# Patient Record
Sex: MALE | Race: WHITE | NOT HISPANIC OR LATINO | URBAN - METROPOLITAN AREA
[De-identification: names, ages, dates, MRNs, and addresses within clinical notes are randomized per-mention and may not be internally consistent; named-entity substitution may affect disease eponyms.]

---

## 2018-06-27 ENCOUNTER — OUTPATIENT (OUTPATIENT)
Dept: OUTPATIENT SERVICES | Facility: HOSPITAL | Age: 58
LOS: 1 days | Discharge: HOME | End: 2018-06-27
Payer: COMMERCIAL

## 2018-06-27 DIAGNOSIS — Z41.9 ENCOUNTER FOR PROCEDURE FOR PURPOSES OTHER THAN REMEDYING HEALTH STATE, UNSPECIFIED: Chronic | ICD-10-CM

## 2018-06-27 DIAGNOSIS — K40.20 BILATERAL INGUINAL HERNIA, WITHOUT OBSTRUCTION OR GANGRENE, NOT SPECIFIED AS RECURRENT: ICD-10-CM

## 2018-06-27 DIAGNOSIS — Z90.49 ACQUIRED ABSENCE OF OTHER SPECIFIED PARTS OF DIGESTIVE TRACT: Chronic | ICD-10-CM

## 2018-06-27 DIAGNOSIS — Z98.890 OTHER SPECIFIED POSTPROCEDURAL STATES: Chronic | ICD-10-CM

## 2018-06-27 LAB
ALBUMIN SERPL ELPH-MCNC: 3.6 G/DL — SIGNIFICANT CHANGE UP (ref 3.3–5)
ALP SERPL-CCNC: 83 U/L — SIGNIFICANT CHANGE UP (ref 40–120)
ALT FLD-CCNC: 39 U/L — SIGNIFICANT CHANGE UP (ref 12–78)
ANION GAP SERPL CALC-SCNC: 4 MMOL/L — LOW (ref 5–17)
APPEARANCE UR: CLEAR — SIGNIFICANT CHANGE UP
AST SERPL-CCNC: 37 U/L — SIGNIFICANT CHANGE UP (ref 15–37)
BASOPHILS # BLD AUTO: 0.05 K/UL — SIGNIFICANT CHANGE UP (ref 0–0.2)
BASOPHILS NFR BLD AUTO: 0.9 % — SIGNIFICANT CHANGE UP (ref 0–2)
BILIRUB SERPL-MCNC: 0.7 MG/DL — SIGNIFICANT CHANGE UP (ref 0.2–1.2)
BILIRUB UR-MCNC: NEGATIVE — SIGNIFICANT CHANGE UP
BUN SERPL-MCNC: 27 MG/DL — HIGH (ref 7–23)
CALCIUM SERPL-MCNC: 8.8 MG/DL — SIGNIFICANT CHANGE UP (ref 8.5–10.1)
CHLORIDE SERPL-SCNC: 110 MMOL/L — HIGH (ref 96–108)
CO2 SERPL-SCNC: 30 MMOL/L — SIGNIFICANT CHANGE UP (ref 22–31)
COLOR SPEC: YELLOW — SIGNIFICANT CHANGE UP
CREAT SERPL-MCNC: 1.25 MG/DL — SIGNIFICANT CHANGE UP (ref 0.5–1.3)
DIFF PNL FLD: NEGATIVE — SIGNIFICANT CHANGE UP
EOSINOPHIL # BLD AUTO: 0.18 K/UL — SIGNIFICANT CHANGE UP (ref 0–0.5)
EOSINOPHIL NFR BLD AUTO: 3.1 % — SIGNIFICANT CHANGE UP (ref 0–6)
GLUCOSE SERPL-MCNC: 84 MG/DL — SIGNIFICANT CHANGE UP (ref 70–99)
GLUCOSE UR QL: NEGATIVE MG/DL — SIGNIFICANT CHANGE UP
HCT VFR BLD CALC: 44.9 % — SIGNIFICANT CHANGE UP (ref 39–50)
HGB BLD-MCNC: 15.4 G/DL — SIGNIFICANT CHANGE UP (ref 13–17)
IMM GRANULOCYTES NFR BLD AUTO: 0.2 % — SIGNIFICANT CHANGE UP (ref 0–1.5)
KETONES UR-MCNC: NEGATIVE — SIGNIFICANT CHANGE UP
LEUKOCYTE ESTERASE UR-ACNC: NEGATIVE — SIGNIFICANT CHANGE UP
LYMPHOCYTES # BLD AUTO: 1.63 K/UL — SIGNIFICANT CHANGE UP (ref 1–3.3)
LYMPHOCYTES # BLD AUTO: 28.4 % — SIGNIFICANT CHANGE UP (ref 13–44)
MCHC RBC-ENTMCNC: 32.3 PG — SIGNIFICANT CHANGE UP (ref 27–34)
MCHC RBC-ENTMCNC: 34.3 GM/DL — SIGNIFICANT CHANGE UP (ref 32–36)
MCV RBC AUTO: 94.1 FL — SIGNIFICANT CHANGE UP (ref 80–100)
MONOCYTES # BLD AUTO: 0.43 K/UL — SIGNIFICANT CHANGE UP (ref 0–0.9)
MONOCYTES NFR BLD AUTO: 7.5 % — SIGNIFICANT CHANGE UP (ref 2–14)
MRSA PCR RESULT.: SIGNIFICANT CHANGE UP
NEUTROPHILS # BLD AUTO: 3.44 K/UL — SIGNIFICANT CHANGE UP (ref 1.8–7.4)
NEUTROPHILS NFR BLD AUTO: 59.9 % — SIGNIFICANT CHANGE UP (ref 43–77)
NITRITE UR-MCNC: NEGATIVE — SIGNIFICANT CHANGE UP
PH UR: 5 — SIGNIFICANT CHANGE UP (ref 5–8)
PLATELET # BLD AUTO: 185 K/UL — SIGNIFICANT CHANGE UP (ref 150–400)
POTASSIUM SERPL-MCNC: 4.3 MMOL/L — SIGNIFICANT CHANGE UP (ref 3.5–5.3)
POTASSIUM SERPL-SCNC: 4.3 MMOL/L — SIGNIFICANT CHANGE UP (ref 3.5–5.3)
PROT SERPL-MCNC: 7 GM/DL — SIGNIFICANT CHANGE UP (ref 6–8.3)
PROT UR-MCNC: NEGATIVE MG/DL — SIGNIFICANT CHANGE UP
RBC # BLD: 4.77 M/UL — SIGNIFICANT CHANGE UP (ref 4.2–5.8)
RBC # FLD: 12.2 % — SIGNIFICANT CHANGE UP (ref 10.3–14.5)
S AUREUS DNA NOSE QL NAA+PROBE: SIGNIFICANT CHANGE UP
SODIUM SERPL-SCNC: 144 MMOL/L — SIGNIFICANT CHANGE UP (ref 135–145)
SP GR SPEC: 1.01 — SIGNIFICANT CHANGE UP (ref 1.01–1.02)
UROBILINOGEN FLD QL: NEGATIVE MG/DL — SIGNIFICANT CHANGE UP
WBC # BLD: 5.74 K/UL — SIGNIFICANT CHANGE UP (ref 3.8–10.5)
WBC # FLD AUTO: 5.74 K/UL — SIGNIFICANT CHANGE UP (ref 3.8–10.5)

## 2018-06-27 PROCEDURE — 71046 X-RAY EXAM CHEST 2 VIEWS: CPT | Mod: 26

## 2018-06-27 PROCEDURE — 93010 ELECTROCARDIOGRAM REPORT: CPT

## 2018-06-27 NOTE — ASU PATIENT PROFILE, ADULT - PMH
Asthma    GERD (Gastroesophageal Reflux Disease)    Groin pain  b/l  Inguinal hernia bilateral, non-recurrent    Pancreatitis

## 2018-06-27 NOTE — CHART NOTE - NSCHARTNOTEFT_GEN_A_CORE
BP left arm sitting 117/75   HR 70 bpm  Yudy 98 F  RR 14/min  O2 sat 100% on RA      1. Labs as per surgeon  2. EKG  3. Medical clearance with PCP Dr Dg Marquez  4. discussed EZ sponges,  mupirocin & day of procedure instructions  5. CXR

## 2018-06-27 NOTE — ASU PATIENT PROFILE, ADULT - PSH
Elective surgery  pudendal nerve release  S/P Arthroscopic Knee Surgery  b/l  S/P colonoscopy    S/P laparoscopic cholecystectomy  2016

## 2018-07-02 ENCOUNTER — OUTPATIENT (OUTPATIENT)
Dept: OUTPATIENT SERVICES | Facility: HOSPITAL | Age: 58
LOS: 1 days | Discharge: HOME | End: 2018-07-02
Payer: COMMERCIAL

## 2018-07-02 VITALS
RESPIRATION RATE: 15 BRPM | OXYGEN SATURATION: 100 % | TEMPERATURE: 98 F | DIASTOLIC BLOOD PRESSURE: 81 MMHG | WEIGHT: 169.98 LBS | SYSTOLIC BLOOD PRESSURE: 138 MMHG | HEIGHT: 67 IN | HEART RATE: 58 BPM

## 2018-07-02 VITALS
SYSTOLIC BLOOD PRESSURE: 117 MMHG | TEMPERATURE: 98 F | HEART RATE: 60 BPM | DIASTOLIC BLOOD PRESSURE: 66 MMHG | RESPIRATION RATE: 16 BRPM | OXYGEN SATURATION: 99 %

## 2018-07-02 DIAGNOSIS — Z41.9 ENCOUNTER FOR PROCEDURE FOR PURPOSES OTHER THAN REMEDYING HEALTH STATE, UNSPECIFIED: Chronic | ICD-10-CM

## 2018-07-02 DIAGNOSIS — Z90.49 ACQUIRED ABSENCE OF OTHER SPECIFIED PARTS OF DIGESTIVE TRACT: Chronic | ICD-10-CM

## 2018-07-02 DIAGNOSIS — Z98.890 OTHER SPECIFIED POSTPROCEDURAL STATES: Chronic | ICD-10-CM

## 2018-07-02 PROCEDURE — 49650 LAP ING HERNIA REPAIR INIT: CPT | Mod: AS,50

## 2018-07-02 RX ORDER — PANTOPRAZOLE SODIUM 20 MG/1
1 TABLET, DELAYED RELEASE ORAL
Qty: 0 | Refills: 0 | COMMUNITY

## 2018-07-02 RX ORDER — OMEGA-3 ACID ETHYL ESTERS 1 G
0 CAPSULE ORAL
Qty: 0 | Refills: 0 | COMMUNITY

## 2018-07-02 RX ORDER — CHOLECALCIFEROL (VITAMIN D3) 125 MCG
1 CAPSULE ORAL
Qty: 0 | Refills: 0 | COMMUNITY

## 2018-07-02 RX ORDER — FENTANYL CITRATE 50 UG/ML
50 INJECTION INTRAVENOUS
Qty: 0 | Refills: 0 | Status: DISCONTINUED | OUTPATIENT
Start: 2018-07-02 | End: 2018-07-02

## 2018-07-02 RX ORDER — URSODIOL 250 MG/1
1 TABLET, FILM COATED ORAL
Qty: 0 | Refills: 0 | COMMUNITY

## 2018-07-02 RX ORDER — ONDANSETRON 8 MG/1
4 TABLET, FILM COATED ORAL ONCE
Qty: 0 | Refills: 0 | Status: DISCONTINUED | OUTPATIENT
Start: 2018-07-02 | End: 2018-07-02

## 2018-07-02 RX ORDER — LIPASE/PROTEASE/AMYLASE 16-48-48K
0 CAPSULE,DELAYED RELEASE (ENTERIC COATED) ORAL
Qty: 0 | Refills: 0 | COMMUNITY

## 2018-07-02 RX ORDER — ALBUTEROL 90 UG/1
2 AEROSOL, METERED ORAL
Qty: 0 | Refills: 0 | COMMUNITY

## 2018-07-02 RX ORDER — OXYCODONE HYDROCHLORIDE 5 MG/1
10 TABLET ORAL EVERY 6 HOURS
Qty: 0 | Refills: 0 | Status: DISCONTINUED | OUTPATIENT
Start: 2018-07-02 | End: 2018-07-02

## 2018-07-02 RX ADMIN — FENTANYL CITRATE 50 MICROGRAM(S): 50 INJECTION INTRAVENOUS at 08:51

## 2018-07-02 RX ADMIN — FENTANYL CITRATE 50 MICROGRAM(S): 50 INJECTION INTRAVENOUS at 09:02

## 2018-07-02 RX ADMIN — OXYCODONE HYDROCHLORIDE 10 MILLIGRAM(S): 5 TABLET ORAL at 09:02

## 2018-07-02 RX ADMIN — FENTANYL CITRATE 50 MICROGRAM(S): 50 INJECTION INTRAVENOUS at 08:44

## 2018-07-02 NOTE — BRIEF OPERATIVE NOTE - PROCEDURE
<<-----Click on this checkbox to enter Procedure Laparoscopic inguinal hernia repair  07/02/2018    Active  [email or web address]

## 2018-07-02 NOTE — ASU DISCHARGE PLAN (ADULT/PEDIATRIC). - MEDICATION SUMMARY - MEDICATIONS TO TAKE
I will START or STAY ON the medications listed below when I get home from the hospital:    Ventolin HFA 90 mcg/inh inhalation aerosol  -- 2 puff(s) inhaled 4 times a day, As Needed  -- Indication: For as per PMD    Creon  -- orally 3 times a day  -- Indication: For as per PMD    ursodiol 300 mg oral capsule  -- 1 cap(s) by mouth 3 times a day  -- Indication: For as per PMD    Flax Seed Oil oral capsule  -- orally once a day  -- Indication: For as per PMD    Omega-3 oral capsule  -- orally once a day  -- Indication: For as per PMD    pantoprazole 40 mg oral delayed release tablet  -- 1 tab(s) by mouth once a day  -- Indication: For as per PMD    Vitamin D3 1000 intl units oral tablet  -- 1 tab(s) by mouth once a day  -- Indication: For as per PMD

## 2018-07-02 NOTE — ASU DISCHARGE PLAN (ADULT/PEDIATRIC). - NOTIFY
Persistent Nausea and Vomiting/Bleeding that does not stop/Swelling that continues/Unable to Urinate

## 2018-07-02 NOTE — ASU PATIENT PROFILE, ADULT - VISION (WITH CORRECTIVE LENSES IF THE PATIENT USUALLY WEARS THEM):
Partially impaired: cannot see medication labels or newsprint, but can see obstacles in path, and the surrounding layout; can count fingers at arm's length/DISTANCE

## 2018-07-02 NOTE — BRIEF OPERATIVE NOTE - PRE-OP DX
Bilateral inguinal hernia without obstruction or gangrene, recurrence not specified  07/02/2018    Active  Harish Irving

## 2018-07-10 DIAGNOSIS — K40.20 BILATERAL INGUINAL HERNIA, WITHOUT OBSTRUCTION OR GANGRENE, NOT SPECIFIED AS RECURRENT: ICD-10-CM

## 2018-07-10 DIAGNOSIS — K21.9 GASTRO-ESOPHAGEAL REFLUX DISEASE WITHOUT ESOPHAGITIS: ICD-10-CM

## 2018-07-10 DIAGNOSIS — Z90.49 ACQUIRED ABSENCE OF OTHER SPECIFIED PARTS OF DIGESTIVE TRACT: ICD-10-CM

## 2018-07-10 DIAGNOSIS — J45.909 UNSPECIFIED ASTHMA, UNCOMPLICATED: ICD-10-CM

## 2020-07-21 ENCOUNTER — EMERGENCY (EMERGENCY)
Facility: HOSPITAL | Age: 60
LOS: 0 days | Discharge: HOME | End: 2020-07-21
Payer: COMMERCIAL

## 2020-07-21 VITALS
DIASTOLIC BLOOD PRESSURE: 64 MMHG | RESPIRATION RATE: 18 BRPM | HEART RATE: 54 BPM | OXYGEN SATURATION: 100 % | TEMPERATURE: 98 F | SYSTOLIC BLOOD PRESSURE: 133 MMHG

## 2020-07-21 DIAGNOSIS — Z98.890 OTHER SPECIFIED POSTPROCEDURAL STATES: Chronic | ICD-10-CM

## 2020-07-21 DIAGNOSIS — Z88.8 ALLERGY STATUS TO OTHER DRUGS, MEDICAMENTS AND BIOLOGICAL SUBSTANCES: ICD-10-CM

## 2020-07-21 DIAGNOSIS — Z90.49 ACQUIRED ABSENCE OF OTHER SPECIFIED PARTS OF DIGESTIVE TRACT: Chronic | ICD-10-CM

## 2020-07-21 DIAGNOSIS — B34.9 VIRAL INFECTION, UNSPECIFIED: ICD-10-CM

## 2020-07-21 DIAGNOSIS — J02.9 ACUTE PHARYNGITIS, UNSPECIFIED: ICD-10-CM

## 2020-07-21 DIAGNOSIS — Z41.9 ENCOUNTER FOR PROCEDURE FOR PURPOSES OTHER THAN REMEDYING HEALTH STATE, UNSPECIFIED: Chronic | ICD-10-CM

## 2020-07-21 DIAGNOSIS — Z20.828 CONTACT WITH AND (SUSPECTED) EXPOSURE TO OTHER VIRAL COMMUNICABLE DISEASES: ICD-10-CM

## 2020-07-21 PROBLEM — K40.20 BILATERAL INGUINAL HERNIA, WITHOUT OBSTRUCTION OR GANGRENE, NOT SPECIFIED AS RECURRENT: Chronic | Status: ACTIVE | Noted: 2018-06-27

## 2020-07-21 PROBLEM — R10.30 LOWER ABDOMINAL PAIN, UNSPECIFIED: Chronic | Status: ACTIVE | Noted: 2018-06-27

## 2020-07-21 PROBLEM — J45.909 UNSPECIFIED ASTHMA, UNCOMPLICATED: Chronic | Status: ACTIVE | Noted: 2018-06-27

## 2020-07-21 LAB — SARS-COV-2 RNA SPEC QL NAA+PROBE: SIGNIFICANT CHANGE UP

## 2020-07-21 PROCEDURE — 99283 EMERGENCY DEPT VISIT LOW MDM: CPT

## 2020-07-21 PROCEDURE — U0003: CPT

## 2020-07-21 NOTE — ED STATDOCS - OBJECTIVE STATEMENT
Pt presents to ED with no fever, no cough, no runny nose, no body aches, +sore throat x 1 days. Pt recently exposed to COVID-19. Pt here for testing.

## 2020-07-21 NOTE — ED ADULT NURSE NOTE - OBJECTIVE STATEMENT
PATIENT WAS TREATED, EVALUATED AND DISCHARGED BY INTAKE PROVIDER. PLEASE SEE PROVIDER NOTE FOR ASSESSMENT.  DISCHARGE INSTRUCTIONS REVIEWED WITH PATIENT VERBALLY, PT VERBALIZED UNDERSTANDING OF DISCHARGE INSTRUCTIONS. PAPER COPY OF DISCHARGE INSTRUCTIONS GIVEN TO PATIENT WITH SELF ANA AND COVID 19 INFORMATION.

## 2020-07-21 NOTE — ED STATDOCS - PATIENT PORTAL LINK FT
You can access the FollowMyHealth Patient Portal offered by [facility] by registering at the following website: [email or web address]. By joining [employer]’s FollowMyHealth portal, you will also be able to view your health information using other applications (apps) compatible with our system.

## 2020-08-05 ENCOUNTER — RESULT REVIEW (OUTPATIENT)
Age: 60
End: 2020-08-05

## 2021-02-17 ENCOUNTER — OUTPATIENT (OUTPATIENT)
Dept: OUTPATIENT SERVICES | Facility: HOSPITAL | Age: 61
LOS: 1 days | End: 2021-02-17
Payer: COMMERCIAL

## 2021-02-17 DIAGNOSIS — Z98.890 OTHER SPECIFIED POSTPROCEDURAL STATES: Chronic | ICD-10-CM

## 2021-02-17 DIAGNOSIS — Z90.49 ACQUIRED ABSENCE OF OTHER SPECIFIED PARTS OF DIGESTIVE TRACT: Chronic | ICD-10-CM

## 2021-02-17 DIAGNOSIS — Z20.828 CONTACT WITH AND (SUSPECTED) EXPOSURE TO OTHER VIRAL COMMUNICABLE DISEASES: ICD-10-CM

## 2021-02-17 DIAGNOSIS — Z41.9 ENCOUNTER FOR PROCEDURE FOR PURPOSES OTHER THAN REMEDYING HEALTH STATE, UNSPECIFIED: Chronic | ICD-10-CM

## 2021-02-17 LAB — SARS-COV-2 RNA SPEC QL NAA+PROBE: SIGNIFICANT CHANGE UP

## 2021-02-17 PROCEDURE — C9803: CPT

## 2021-02-17 PROCEDURE — U0003: CPT

## 2021-02-17 PROCEDURE — U0005: CPT

## 2021-02-18 DIAGNOSIS — Z20.828 CONTACT WITH AND (SUSPECTED) EXPOSURE TO OTHER VIRAL COMMUNICABLE DISEASES: ICD-10-CM

## 2022-05-06 ENCOUNTER — EMERGENCY (EMERGENCY)
Facility: HOSPITAL | Age: 62
LOS: 0 days | Discharge: HOME | End: 2022-05-06
Attending: EMERGENCY MEDICINE
Payer: COMMERCIAL

## 2022-05-06 VITALS
TEMPERATURE: 100 F | HEART RATE: 88 BPM | SYSTOLIC BLOOD PRESSURE: 101 MMHG | DIASTOLIC BLOOD PRESSURE: 66 MMHG | OXYGEN SATURATION: 95 % | RESPIRATION RATE: 19 BRPM

## 2022-05-06 VITALS — WEIGHT: 173.94 LBS | HEIGHT: 67 IN

## 2022-05-06 DIAGNOSIS — Z41.9 ENCOUNTER FOR PROCEDURE FOR PURPOSES OTHER THAN REMEDYING HEALTH STATE, UNSPECIFIED: Chronic | ICD-10-CM

## 2022-05-06 DIAGNOSIS — J45.909 UNSPECIFIED ASTHMA, UNCOMPLICATED: ICD-10-CM

## 2022-05-06 DIAGNOSIS — K21.9 GASTRO-ESOPHAGEAL REFLUX DISEASE WITHOUT ESOPHAGITIS: ICD-10-CM

## 2022-05-06 DIAGNOSIS — R10.9 UNSPECIFIED ABDOMINAL PAIN: ICD-10-CM

## 2022-05-06 DIAGNOSIS — Z98.890 OTHER SPECIFIED POSTPROCEDURAL STATES: Chronic | ICD-10-CM

## 2022-05-06 DIAGNOSIS — K29.80 DUODENITIS WITHOUT BLEEDING: ICD-10-CM

## 2022-05-06 DIAGNOSIS — Z90.49 ACQUIRED ABSENCE OF OTHER SPECIFIED PARTS OF DIGESTIVE TRACT: Chronic | ICD-10-CM

## 2022-05-06 DIAGNOSIS — Z88.6 ALLERGY STATUS TO ANALGESIC AGENT: ICD-10-CM

## 2022-05-06 LAB
ALBUMIN SERPL ELPH-MCNC: 3.5 G/DL — SIGNIFICANT CHANGE UP (ref 3.3–5)
ALP SERPL-CCNC: 78 U/L — SIGNIFICANT CHANGE UP (ref 40–120)
ALT FLD-CCNC: 24 U/L — SIGNIFICANT CHANGE UP (ref 12–78)
ANION GAP SERPL CALC-SCNC: 8 MMOL/L — SIGNIFICANT CHANGE UP (ref 5–17)
APTT BLD: 24.8 SEC — LOW (ref 27.5–35.5)
AST SERPL-CCNC: 16 U/L — SIGNIFICANT CHANGE UP (ref 15–37)
BASOPHILS # BLD AUTO: 0.03 K/UL — SIGNIFICANT CHANGE UP (ref 0–0.2)
BASOPHILS NFR BLD AUTO: 0.3 % — SIGNIFICANT CHANGE UP (ref 0–2)
BILIRUB SERPL-MCNC: 1.4 MG/DL — HIGH (ref 0.2–1.2)
BUN SERPL-MCNC: 17 MG/DL — SIGNIFICANT CHANGE UP (ref 7–23)
CALCIUM SERPL-MCNC: 8.8 MG/DL — SIGNIFICANT CHANGE UP (ref 8.5–10.1)
CHLORIDE SERPL-SCNC: 109 MMOL/L — HIGH (ref 96–108)
CO2 SERPL-SCNC: 23 MMOL/L — SIGNIFICANT CHANGE UP (ref 22–31)
CREAT SERPL-MCNC: 1.58 MG/DL — HIGH (ref 0.5–1.3)
EGFR: 49 ML/MIN/1.73M2 — LOW
EOSINOPHIL # BLD AUTO: 0.03 K/UL — SIGNIFICANT CHANGE UP (ref 0–0.5)
EOSINOPHIL NFR BLD AUTO: 0.3 % — SIGNIFICANT CHANGE UP (ref 0–6)
GLUCOSE SERPL-MCNC: 123 MG/DL — HIGH (ref 70–99)
HCT VFR BLD CALC: 42 % — SIGNIFICANT CHANGE UP (ref 39–50)
HGB BLD-MCNC: 14.3 G/DL — SIGNIFICANT CHANGE UP (ref 13–17)
IMM GRANULOCYTES NFR BLD AUTO: 0.3 % — SIGNIFICANT CHANGE UP (ref 0–1.5)
INR BLD: 1.3 RATIO — HIGH (ref 0.88–1.16)
LACTATE SERPL-SCNC: 1 MMOL/L — SIGNIFICANT CHANGE UP (ref 0.7–2)
LIDOCAIN IGE QN: 41 U/L — LOW (ref 73–393)
LYMPHOCYTES # BLD AUTO: 0.7 K/UL — LOW (ref 1–3.3)
LYMPHOCYTES # BLD AUTO: 5.9 % — LOW (ref 13–44)
MCHC RBC-ENTMCNC: 31.8 PG — SIGNIFICANT CHANGE UP (ref 27–34)
MCHC RBC-ENTMCNC: 34 GM/DL — SIGNIFICANT CHANGE UP (ref 32–36)
MCV RBC AUTO: 93.3 FL — SIGNIFICANT CHANGE UP (ref 80–100)
MONOCYTES # BLD AUTO: 1.19 K/UL — HIGH (ref 0–0.9)
MONOCYTES NFR BLD AUTO: 10.1 % — SIGNIFICANT CHANGE UP (ref 2–14)
NEUTROPHILS # BLD AUTO: 9.82 K/UL — HIGH (ref 1.8–7.4)
NEUTROPHILS NFR BLD AUTO: 83.1 % — HIGH (ref 43–77)
PLATELET # BLD AUTO: 176 K/UL — SIGNIFICANT CHANGE UP (ref 150–400)
POTASSIUM SERPL-MCNC: 4 MMOL/L — SIGNIFICANT CHANGE UP (ref 3.5–5.3)
POTASSIUM SERPL-SCNC: 4 MMOL/L — SIGNIFICANT CHANGE UP (ref 3.5–5.3)
PROT SERPL-MCNC: 6.8 GM/DL — SIGNIFICANT CHANGE UP (ref 6–8.3)
PROTHROM AB SERPL-ACNC: 15.1 SEC — HIGH (ref 10.5–13.4)
RBC # BLD: 4.5 M/UL — SIGNIFICANT CHANGE UP (ref 4.2–5.8)
RBC # FLD: 12.8 % — SIGNIFICANT CHANGE UP (ref 10.3–14.5)
SODIUM SERPL-SCNC: 140 MMOL/L — SIGNIFICANT CHANGE UP (ref 135–145)
WBC # BLD: 11.81 K/UL — HIGH (ref 3.8–10.5)
WBC # FLD AUTO: 11.81 K/UL — HIGH (ref 3.8–10.5)

## 2022-05-06 PROCEDURE — 93010 ELECTROCARDIOGRAM REPORT: CPT

## 2022-05-06 PROCEDURE — 99285 EMERGENCY DEPT VISIT HI MDM: CPT | Mod: 25

## 2022-05-06 PROCEDURE — 74177 CT ABD & PELVIS W/CONTRAST: CPT | Mod: 26,MA

## 2022-05-06 PROCEDURE — 99285 EMERGENCY DEPT VISIT HI MDM: CPT

## 2022-05-06 PROCEDURE — 83690 ASSAY OF LIPASE: CPT

## 2022-05-06 PROCEDURE — 83605 ASSAY OF LACTIC ACID: CPT

## 2022-05-06 PROCEDURE — 74177 CT ABD & PELVIS W/CONTRAST: CPT | Mod: MA

## 2022-05-06 PROCEDURE — 85025 COMPLETE CBC W/AUTO DIFF WBC: CPT

## 2022-05-06 PROCEDURE — 85730 THROMBOPLASTIN TIME PARTIAL: CPT

## 2022-05-06 PROCEDURE — 93005 ELECTROCARDIOGRAM TRACING: CPT

## 2022-05-06 PROCEDURE — 80053 COMPREHEN METABOLIC PANEL: CPT

## 2022-05-06 PROCEDURE — 71045 X-RAY EXAM CHEST 1 VIEW: CPT | Mod: 26

## 2022-05-06 PROCEDURE — 71045 X-RAY EXAM CHEST 1 VIEW: CPT

## 2022-05-06 PROCEDURE — 85610 PROTHROMBIN TIME: CPT

## 2022-05-06 PROCEDURE — 36415 COLL VENOUS BLD VENIPUNCTURE: CPT

## 2022-05-06 PROCEDURE — 87040 BLOOD CULTURE FOR BACTERIA: CPT

## 2022-05-06 RX ORDER — MORPHINE SULFATE 50 MG/1
4 CAPSULE, EXTENDED RELEASE ORAL ONCE
Refills: 0 | Status: DISCONTINUED | OUTPATIENT
Start: 2022-05-06 | End: 2022-05-06

## 2022-05-06 RX ORDER — SODIUM CHLORIDE 9 MG/ML
1000 INJECTION INTRAMUSCULAR; INTRAVENOUS; SUBCUTANEOUS ONCE
Refills: 0 | Status: COMPLETED | OUTPATIENT
Start: 2022-05-06 | End: 2022-05-06

## 2022-05-06 RX ORDER — OXYCODONE AND ACETAMINOPHEN 5; 325 MG/1; MG/1
1 TABLET ORAL
Qty: 12 | Refills: 0
Start: 2022-05-06 | End: 2022-05-08

## 2022-05-06 RX ORDER — ONDANSETRON 8 MG/1
4 TABLET, FILM COATED ORAL ONCE
Refills: 0 | Status: COMPLETED | OUTPATIENT
Start: 2022-05-06 | End: 2022-05-06

## 2022-05-06 RX ADMIN — SODIUM CHLORIDE 1000 MILLILITER(S): 9 INJECTION INTRAMUSCULAR; INTRAVENOUS; SUBCUTANEOUS at 19:31

## 2022-05-06 NOTE — ED STATDOCS - CARE PROVIDER_API CALL
Dg Marquez  INTERNAL MEDICINE  [address] [place] [address]  Phone: [phone]  Fax: [phone]  Follow Up Time:

## 2022-05-06 NOTE — ED STATDOCS - NS ED ROS FT
Constitutional: No fever or chills  Eyes: No visual changes  HEENT: No throat pain  CV: No chest pain  Resp: No SOB no cough  GI: + abd pain, nausea. no vomiting  : No dysuria  MSK: No musculoskeletal pain  Skin: No rash  Neuro: No headache

## 2022-05-06 NOTE — ED ADULT NURSE NOTE - OBJECTIVE STATEMENT
patient presents to the ED c/o abdominal pain. + vomiting, nausea, decreased PO intake. hx pancreatitis, patient states today's pain feels similar. patient states he had ECRP monday and put on augmentin by his GI who is in [place].

## 2022-05-06 NOTE — ED ADULT TRIAGE NOTE - CHIEF COMPLAINT QUOTE
patient presenting ambulatory to ED c/o abdominal pain. + vomiting, nausea, decreased PO intake. hx pancreatitis, patient states today's pain feels similar. patient states he had ECRP monday and put on augmentin by his GI who he travels to [place] to see.

## 2022-05-06 NOTE — ED STATDOCS - PATIENT PORTAL LINK FT
You can access the FollowMyHealth Patient Portal offered by [facility] by registering at the following website: [email or web address]. By joining [employer]’s FollowMyHealth portal, you will also be able to view your health information using other applications (apps) compatible with our system.

## 2022-05-06 NOTE — ED STATDOCS - ATTENDING APP SHARED VISIT CONTRIBUTION OF CARE
I, Robbie Taylor MD, personally saw the patient with ACP.  I have personally performed a face to face diagnostic evaluation on this patient.  I have reviewed the ACP note and agree with the history, exam, and plan of care, except as noted.   The initial assessment was performed by myself and then the patient was handed off to the ACP. The patient was followed and re-evaluated by the ACP. All labs, imaging and procedures were evaluated and performed by the ACP and I was available for consultation if any questions in the patients care came up.

## 2022-05-06 NOTE — ED STATDOCS - NS_ ATTENDINGSCRIBEDETAILS _ED_A_ED_FT
I, Robbie Taylor MD,  performed the initial face to face bedside interview with this patient regarding history of present illness, review of symptoms and relevant past medical, social and family history.  I completed an independent physical examination.  I was the initial provider who evaluated this patient.   I personally saw the patient and performed a substantive portion of the visit including all aspects of the medical decision making.  The history, relevant review of systems, past medical and surgical history, medical decision making, and physical examination was documented by the scribe in my presence and I attest to the accuracy of the documentation.

## 2022-05-06 NOTE — ED STATDOCS - OBJECTIVE STATEMENT
61 M hx asthma, inguinal hernia, GERD, pancreatitis here complaining abdominal pain and nausea. no vomiting. pt also reports having low grade fever and chills. pt states symptoms feel similar to pancreatitis.  pt states he went to see GI at [place] on Monday 4 days ago and had an ERCP and EUS procedure and got put on Augmentin afterwards.  pt states that he had a biopsy done of pancreas and thinks this may have caused his symptoms.  non smoker. no EtOH drinker. no drug user.

## 2022-05-06 NOTE — ED STATDOCS - PROGRESS NOTE DETAILS
Pt. on Augmentin.  Carina Bundy PA-C Pt s/p Endo with biopsy pancreas.  CT demonstrating mild circumferential wall thickening of proximal duodenum.  Duodenitis.  Pneumobilia related to ERCP.  No pancreatis.   Mild leukocytosis and elevated creatinine at 1.58 which patient states is normal for him.  Mildly elevated T bili 1.4.  Labs, CT discussed with patient.  Normal Lipases.  I paged his GI Dr. Harish Morton,  at [address] in [place] at [phone] Labs / CT all discussed with patient and wife.  DC with short course of Percocet and GI follow up.  REtuern for any worsening symptmos.  Carina Bundy PA-C Labs / CT all discussed with patient and wife.  DC with short course of Percocet and GI follow up.  REtuern for any worsening symptoms.  Carina Bundy PA-C I spoke with covering physician for Dr. Morton.  Aware of CT reading and will let Dr. Morton know on Monday.  Carina Bundy PA-C

## 2022-05-06 NOTE — ED STATDOCS - NSFOLLOWUPINSTRUCTIONS_ED_ALL_ED_FT
Duodenitis       Duodenitis is inflammation of the lining of the first part of the small intestine (duodenum). It is commonly caused by an infection from bacteria, which may also lead to open sores (ulcers) in the intestine.    Duodenitis may develop suddenly and last for a short time (acute), or it may develop gradually and last for months or years (chronic).      What are the causes?    The most common cause of duodenitis is an infection from a type of bacteria called Helicobacter pylori (H. pylori). Other causes of this condition include:  •Long-term use of NSAIDs.      •Excessive use of alcohol.      •An infection of the small intestine caused by the Giardia parasite (giardiasis).      •Crohn's disease.      •Certain diseases of the body's defense system (immune system).      •Certain treatments for cancer.        What increases the risk?    The following factors may make you more likely to develop this condition:  •Smoking cigarettes.      •Drinking alcohol.      •Having a family history of duodenitis.      •Taking NSAIDs.      •Eating a high-fat diet.        What are the signs or symptoms?    Symptoms of this condition may include:  •Gnawing or burning pain in the upper center of the abdomen (epigastric pain). This may get worse when the stomach is empty and may get better after eating.      •Abdominal cramps.      •Nausea and vomiting.      •Bloody vomit.      •Stools that are bloody, dark, or look like tar.      •Diarrhea.      •Weight loss.      •Fatigue.        How is this diagnosed?    This condition may be diagnosed based on your medical history and a physical exam. You may also have tests, such as:  •Blood tests.      •Stool tests.      •A test that checks the gases in your breath.      •An X-ray that is done after you swallow a liquid (barium) that makes your digestive tract easier to see.      •Endoscopy. This is an exam of the duodenum that is done by putting a thin tube with a tiny camera on the end (endoscope) down your throat. A sample of tissue from your duodenum (biopsy) may be removed with the endoscope and examined under a microscope for signs of inflammation and infection.        How is this treated?    Treatment depends on the cause of your condition. Treatment may include:  •Antibiotic medicine to treat H. pylori infection.      •Stopping your intake of NSAIDs.      •Medicine to reduce stomach acids.      •Medicines to treat other conditions, such as Crohn's disease or giardiasis.      •Surgery to treat severe inflammation that causes scarring or severe bleeding.        Follow these instructions at home:    Medicines     •Take over-the-counter and prescription medicines only as told by your health care provider.      •If you were prescribed an antibiotic medicine, take it as told by your health care provider. Do not stop taking the antibiotic even if you start to feel better.        Eating and drinking      •Eat small, frequent meals.      • Do not drink alcohol.      •Drink enough water to keep your urine pale yellow.    •Follow instructions from your health care provider about eating or drinking restrictions. You may be asked to avoid:  •Caffeinated drinks.      •Chocolate.      •Peppermint or mint-flavored food or drinks.      •Garlic or onions.      •Spicy foods.      •Citrus fruits.      •Tomato-based foods.      •Fatty or fried foods.        General instructions     • Do not use any products that contain nicotine or tobacco, such as cigarettes and e-cigarettes. If you need help quitting, ask your health care provider.      •Keep all follow-up visits as told by your health care provider. This is important.        Contact a health care provider if:    •You have a fever.      •Your symptoms come back, get worse, or do not get better with treatment.        Get help right away if:    •You vomit blood.      •You have severe abdominal pain.      •Your abdomen swells and is painful.      •You have a lot of blood in your stool.      •You feel dizzy or light-headed.        Summary    •Duodenitis is inflammation of the lining of the first part of the small intestine. This part of the small intestine is called the duodenum.      •Duodenitis may develop suddenly and last for a short time (acute), or it may develop gradually and last longer (chronic).      •The most common cause of duodenitis is an infection from a type of bacteria.      •Take over-the-counter and prescription medicines only as told by your health care provider.      This information is not intended to replace advice given to you by your health care provider. Make sure you discuss any questions you have with your health care provider.

## 2022-05-06 NOTE — ED STATDOCS - NSICDXPASTSURGICALHX_GEN_ALL_CORE_FT
PAST SURGICAL HISTORY:  Elective surgery pudendal nerve release    S/P Arthroscopic Knee Surgery b/l    S/P colonoscopy     S/P laparoscopic cholecystectomy 2016

## 2022-05-06 NOTE — ED STATDOCS - NSICDXPASTMEDICALHX_GEN_ALL_CORE_FT
PAST MEDICAL HISTORY:  Asthma     GERD (Gastroesophageal Reflux Disease)     Groin pain b/l    Inguinal hernia bilateral, non-recurrent     Pancreatitis

## 2022-05-06 NOTE — ED STATDOCS - PHYSICAL EXAMINATION
Constitutional: NAD AAOx3  Eyes: PERRLA EOMI  Head: Normocephalic atraumatic  Mouth: MMM  Cardiac: regular rate   Resp: Lungs CTAB  GI: Abd s/nd, epigastric TTP   Neuro: CN2-12 intact  Skin: No visible rashes Constitutional: NAD AAOx3  Eyes: PERRLA EOMI  Head: Normocephalic atraumatic  Mouth: MMM  Cardiac: regular rate   Resp: Lungs CTAB  GI: Abd s/nd, epigastric TTP no rebound or guarding  Neuro: CN2-12 intact  Skin: No visible rashes

## 2022-05-06 NOTE — ED STATDOCS - CLINICAL SUMMARY MEDICAL DECISION MAKING FREE TEXT BOX
61 M presents to the ED with abdominal pain s/p endoscopic biopsy of pancreas. exam with epigastric TTP. will rule out pancreatitis, post surgical complications and reassess 61 M presents to the ED with abdominal pain s/p endoscopic biopsy of pancreas. exam with epigastric TTP. will rule out pancreatitis, post surgical complications and reassess          Labs / CT all discussed with patient and wife.  DC with short course of Percocet and GI follow up.  REtuern for any worsening symptmos.  Carina Bundy PA-C

## 2022-05-12 LAB
CULTURE RESULTS: SIGNIFICANT CHANGE UP
CULTURE RESULTS: SIGNIFICANT CHANGE UP
SPECIMEN SOURCE: SIGNIFICANT CHANGE UP
SPECIMEN SOURCE: SIGNIFICANT CHANGE UP

## 2023-05-30 ENCOUNTER — NON-APPOINTMENT (OUTPATIENT)
Age: 63
End: 2023-05-30

## 2023-06-12 ENCOUNTER — APPOINTMENT (OUTPATIENT)
Dept: SURGERY | Facility: CLINIC | Age: 63
End: 2023-06-12
Payer: COMMERCIAL

## 2023-06-12 VITALS
SYSTOLIC BLOOD PRESSURE: 133 MMHG | BODY MASS INDEX: 26.67 KG/M2 | HEART RATE: 92 BPM | DIASTOLIC BLOOD PRESSURE: 83 MMHG | WEIGHT: 176 LBS | OXYGEN SATURATION: 93 % | HEIGHT: 68 IN

## 2023-06-12 DIAGNOSIS — Z87.19 OTHER SPECIFIED POSTPROCEDURAL STATES: ICD-10-CM

## 2023-06-12 DIAGNOSIS — Z98.890 OTHER SPECIFIED POSTPROCEDURAL STATES: ICD-10-CM

## 2023-06-12 PROCEDURE — 99213 OFFICE O/P EST LOW 20 MIN: CPT

## 2023-06-12 RX ORDER — AVANAFIL 200 MG/1
200 TABLET ORAL
Qty: 3 | Refills: 0 | Status: ACTIVE | COMMUNITY
Start: 2023-06-02

## 2023-06-12 RX ORDER — AZELASTINE HYDROCHLORIDE 137 UG/1
0.1 SPRAY, METERED NASAL
Qty: 30 | Refills: 0 | Status: ACTIVE | COMMUNITY
Start: 2023-05-02

## 2023-06-12 RX ORDER — ALBUTEROL SULFATE 90 UG/1
108 (90 BASE) INHALANT RESPIRATORY (INHALATION)
Qty: 8 | Refills: 0 | Status: ACTIVE | COMMUNITY
Start: 2022-07-14

## 2023-06-12 RX ORDER — FLUTICASONE PROPIONATE 50 UG/1
50 SPRAY, METERED NASAL
Qty: 16 | Refills: 0 | Status: ACTIVE | COMMUNITY
Start: 2023-05-02

## 2023-06-12 RX ORDER — CICLOPIROX OLAMINE 7.7 MG/G
0.77 CREAM TOPICAL
Qty: 30 | Refills: 0 | Status: ACTIVE | COMMUNITY
Start: 2023-04-11

## 2023-06-12 RX ORDER — FLUTICASONE PROPIONATE AND SALMETEROL 50; 100 UG/1; UG/1
100-50 POWDER RESPIRATORY (INHALATION)
Qty: 60 | Refills: 0 | Status: ACTIVE | COMMUNITY
Start: 2022-12-21

## 2023-06-12 RX ORDER — OLOPATADINE HYDROCHLORIDE 2 MG/ML
0.2 SOLUTION OPHTHALMIC
Qty: 5 | Refills: 0 | Status: ACTIVE | COMMUNITY
Start: 2022-07-14

## 2023-06-12 RX ORDER — URSODIOL 300 MG/1
300 CAPSULE ORAL
Qty: 270 | Refills: 0 | Status: ACTIVE | COMMUNITY
Start: 2023-04-27

## 2023-06-12 RX ORDER — DICYCLOMINE HYDROCHLORIDE 20 MG/1
20 TABLET ORAL
Qty: 30 | Refills: 0 | Status: ACTIVE | COMMUNITY
Start: 2023-02-17

## 2023-06-12 RX ORDER — CYCLOBENZAPRINE HYDROCHLORIDE 10 MG/1
10 TABLET, FILM COATED ORAL
Qty: 20 | Refills: 0 | Status: ACTIVE | COMMUNITY
Start: 2023-02-17

## 2023-06-12 RX ORDER — AMOXICILLIN AND CLAVULANATE POTASSIUM 875; 125 MG/1; MG/1
875-125 TABLET, COATED ORAL
Qty: 20 | Refills: 0 | Status: COMPLETED | COMMUNITY
Start: 2023-05-02

## 2023-06-12 RX ORDER — NIRMATRELVIR AND RITONAVIR 150-100 MG
10 X 150 MG & KIT ORAL
Qty: 20 | Refills: 0 | Status: COMPLETED | COMMUNITY
Start: 2022-12-30

## 2023-06-12 RX ORDER — PANCRELIPASE 120000; 24000; 76000 [USP'U]/1; [USP'U]/1; [USP'U]/1
24000-76000 CAPSULE, DELAYED RELEASE PELLETS ORAL
Qty: 750 | Refills: 0 | Status: ACTIVE | COMMUNITY
Start: 2023-03-03

## 2023-06-12 RX ORDER — METHYLPREDNISOLONE 4 MG/1
4 TABLET ORAL
Qty: 21 | Refills: 0 | Status: ACTIVE | COMMUNITY
Start: 2023-05-02

## 2023-06-12 RX ORDER — PANTOPRAZOLE 40 MG/1
40 TABLET, DELAYED RELEASE ORAL
Qty: 180 | Refills: 0 | Status: ACTIVE | COMMUNITY
Start: 2023-04-27

## 2023-06-12 RX ORDER — HYDROCORTISONE ACETATE 25 MG/1
25 SUPPOSITORY RECTAL
Qty: 30 | Refills: 0 | Status: ACTIVE | COMMUNITY
Start: 2021-12-22

## 2023-06-12 NOTE — PLAN
[FreeTextEntry1] : May increase activity over the next 2 weeks. Symptoms may persist for 2 more months.\par Total time > 22 minutes

## 2023-06-12 NOTE — PHYSICAL EXAM
[JVD] : no jugular venous distention  [Normal Breath Sounds] : Normal breath sounds [Normal Heart Sounds] : normal heart sounds [No Rash or Lesion] : No rash or lesion [Alert] : alert [Oriented to Person] : oriented to person [Oriented to Place] : oriented to place [Calm] : calm [Oriented to Time] : oriented to time [de-identified] : soft, no recurrence, no infection [de-identified] : no testicular masses [de-identified] : moves all 4 extr 5/5

## 2023-06-12 NOTE — CONSULT LETTER
[Dear  ___] : Dear  [unfilled], [Consult Letter:] : I had the pleasure of evaluating your patient, [unfilled]. [Please see my note below.] : Please see my note below. [Consult Closing:] : Thank you very much for allowing me to participate in the care of this patient.  If you have any questions, please do not hesitate to contact me. [Sincerely,] : Sincerely, [FreeTextEntry3] : Wm Aristides PEREA

## 2023-06-12 NOTE — HISTORY OF PRESENT ILLNESS
[de-identified] : [facility] BIH 5 years ago. Felt R groin strain 3 weeks ago. Feeling better with time. No obstruction.

## 2024-12-12 ENCOUNTER — APPOINTMENT (OUTPATIENT)
Dept: SURGERY | Facility: CLINIC | Age: 64
End: 2024-12-12
Payer: MEDICARE

## 2024-12-12 DIAGNOSIS — T24.301A: ICD-10-CM

## 2024-12-12 PROCEDURE — 99202 OFFICE O/P NEW SF 15 MIN: CPT
